# Patient Record
Sex: FEMALE | Race: BLACK OR AFRICAN AMERICAN | NOT HISPANIC OR LATINO | URBAN - METROPOLITAN AREA
[De-identification: names, ages, dates, MRNs, and addresses within clinical notes are randomized per-mention and may not be internally consistent; named-entity substitution may affect disease eponyms.]

---

## 2018-10-23 ENCOUNTER — EMERGENCY (EMERGENCY)
Facility: HOSPITAL | Age: 63
LOS: 1 days | Discharge: HOME | End: 2018-10-23
Attending: EMERGENCY MEDICINE | Admitting: EMERGENCY MEDICINE
Payer: SELF-PAY

## 2018-10-23 VITALS
SYSTOLIC BLOOD PRESSURE: 147 MMHG | DIASTOLIC BLOOD PRESSURE: 69 MMHG | OXYGEN SATURATION: 100 % | HEART RATE: 69 BPM | RESPIRATION RATE: 18 BRPM | TEMPERATURE: 98 F

## 2018-10-23 VITALS
TEMPERATURE: 97 F | DIASTOLIC BLOOD PRESSURE: 71 MMHG | HEART RATE: 72 BPM | RESPIRATION RATE: 18 BRPM | SYSTOLIC BLOOD PRESSURE: 128 MMHG | OXYGEN SATURATION: 100 %

## 2018-10-23 LAB
ALBUMIN SERPL ELPH-MCNC: 4.1 G/DL — SIGNIFICANT CHANGE UP (ref 3.3–5)
ALP SERPL-CCNC: 68 U/L — SIGNIFICANT CHANGE UP (ref 40–120)
ALT FLD-CCNC: 12 U/L — SIGNIFICANT CHANGE UP (ref 4–33)
APTT BLD: 37.1 SEC — SIGNIFICANT CHANGE UP (ref 27.5–37.4)
AST SERPL-CCNC: 20 U/L — SIGNIFICANT CHANGE UP (ref 4–32)
BASOPHILS # BLD AUTO: 0.02 K/UL — SIGNIFICANT CHANGE UP (ref 0–0.2)
BASOPHILS NFR BLD AUTO: 0.4 % — SIGNIFICANT CHANGE UP (ref 0–2)
BILIRUB SERPL-MCNC: 0.2 MG/DL — SIGNIFICANT CHANGE UP (ref 0.2–1.2)
BUN SERPL-MCNC: 15 MG/DL — SIGNIFICANT CHANGE UP (ref 7–23)
CALCIUM SERPL-MCNC: 9.8 MG/DL — SIGNIFICANT CHANGE UP (ref 8.4–10.5)
CHLORIDE SERPL-SCNC: 103 MMOL/L — SIGNIFICANT CHANGE UP (ref 98–107)
CO2 SERPL-SCNC: 25 MMOL/L — SIGNIFICANT CHANGE UP (ref 22–31)
CREAT SERPL-MCNC: 0.8 MG/DL — SIGNIFICANT CHANGE UP (ref 0.5–1.3)
EOSINOPHIL # BLD AUTO: 0.13 K/UL — SIGNIFICANT CHANGE UP (ref 0–0.5)
EOSINOPHIL NFR BLD AUTO: 2.7 % — SIGNIFICANT CHANGE UP (ref 0–6)
GLUCOSE SERPL-MCNC: 80 MG/DL — SIGNIFICANT CHANGE UP (ref 70–99)
HCT VFR BLD CALC: 40.2 % — SIGNIFICANT CHANGE UP (ref 34.5–45)
HGB BLD-MCNC: 13.7 G/DL — SIGNIFICANT CHANGE UP (ref 11.5–15.5)
IMM GRANULOCYTES # BLD AUTO: 0.01 # — SIGNIFICANT CHANGE UP
IMM GRANULOCYTES NFR BLD AUTO: 0.2 % — SIGNIFICANT CHANGE UP (ref 0–1.5)
INR BLD: 1.18 — HIGH (ref 0.88–1.17)
LYMPHOCYTES # BLD AUTO: 2.3 K/UL — SIGNIFICANT CHANGE UP (ref 1–3.3)
LYMPHOCYTES # BLD AUTO: 47.2 % — HIGH (ref 13–44)
MCHC RBC-ENTMCNC: 28.5 PG — SIGNIFICANT CHANGE UP (ref 27–34)
MCHC RBC-ENTMCNC: 34.1 % — SIGNIFICANT CHANGE UP (ref 32–36)
MCV RBC AUTO: 83.6 FL — SIGNIFICANT CHANGE UP (ref 80–100)
MONOCYTES # BLD AUTO: 0.61 K/UL — SIGNIFICANT CHANGE UP (ref 0–0.9)
MONOCYTES NFR BLD AUTO: 12.5 % — SIGNIFICANT CHANGE UP (ref 2–14)
NEUTROPHILS # BLD AUTO: 1.8 K/UL — SIGNIFICANT CHANGE UP (ref 1.8–7.4)
NEUTROPHILS NFR BLD AUTO: 37 % — LOW (ref 43–77)
NRBC # FLD: 0 — SIGNIFICANT CHANGE UP
PLATELET # BLD AUTO: 211 K/UL — SIGNIFICANT CHANGE UP (ref 150–400)
PMV BLD: 11.4 FL — SIGNIFICANT CHANGE UP (ref 7–13)
POTASSIUM SERPL-MCNC: 3.6 MMOL/L — SIGNIFICANT CHANGE UP (ref 3.5–5.3)
POTASSIUM SERPL-SCNC: 3.6 MMOL/L — SIGNIFICANT CHANGE UP (ref 3.5–5.3)
PROT SERPL-MCNC: 7 G/DL — SIGNIFICANT CHANGE UP (ref 6–8.3)
PROTHROM AB SERPL-ACNC: 13.6 SEC — HIGH (ref 9.8–13.1)
RBC # BLD: 4.81 M/UL — SIGNIFICANT CHANGE UP (ref 3.8–5.2)
RBC # FLD: 14.1 % — SIGNIFICANT CHANGE UP (ref 10.3–14.5)
SODIUM SERPL-SCNC: 141 MMOL/L — SIGNIFICANT CHANGE UP (ref 135–145)
TSH SERPL-MCNC: 3.09 UIU/ML — SIGNIFICANT CHANGE UP (ref 0.27–4.2)
WBC # BLD: 4.87 K/UL — SIGNIFICANT CHANGE UP (ref 3.8–10.5)
WBC # FLD AUTO: 4.87 K/UL — SIGNIFICANT CHANGE UP (ref 3.8–10.5)

## 2018-10-23 PROCEDURE — 99284 EMERGENCY DEPT VISIT MOD MDM: CPT

## 2018-10-23 PROCEDURE — 93971 EXTREMITY STUDY: CPT | Mod: 26,LT

## 2018-10-23 NOTE — ED ADULT NURSE NOTE - OBJECTIVE STATEMENT
Pt c/o LLE pain with mild swelling since last night.  Pt has  history of DVT x2 in left leg, on Pradaxa.  Pt AAOx3, respirations even and unlabored.  Pt ambulatory.  Labs drawn and sent; IV access obtained.  Pt pending ultrasound.

## 2018-10-23 NOTE — ED ADULT TRIAGE NOTE - CHIEF COMPLAINT QUOTE
pt presents c/o LLE pain with mild swelling since last night. pt reports history of dvt x2 in left leg- on pradaxa. pt also endorses [place] and recent travel to [place]. denies any additional symptoms.  PMHX: htn, dvt, arthritis

## 2018-10-23 NOTE — ED PROVIDER NOTE - MEDICAL DECISION MAKING DETAILS
(1) lle swelling and ttp patient with known h/o lle dvt compliant with pradaxa qd PLAN dvt lle, cbc, cmp, coags in case of hep gtt. (2) palpitations nos PLAN ekg, tsh, cbc r/o anemia

## 2018-10-23 NOTE — ED PROVIDER NOTE - PROGRESS NOTE DETAILS
Yo: duplex shows no DVT.  Plan as per sign-out to have follow-up with cardiology for eval of her palpitations

## 2018-10-23 NOTE — ED PROVIDER NOTE - PHYSICAL EXAMINATION
rHAMAN ATT: B/L LE SYMMETRICAL. MILD LT CALF TTP. BILATERAL FOOT STRONG PULSE SENS INTACT. B/L LE HIP KNEE ANKLE STRENGTH INTACT

## 2018-10-23 NOTE — ED ADULT NURSE NOTE - NSIMPLEMENTINTERV_GEN_ALL_ED
Implemented All Universal Safety Interventions:  [place] to call system. Call bell, personal items and telephone within reach. Instruct patient to call for assistance. Room bathroom lighting operational. Non-slip footwear when patient is off stretcher. Physically safe environment: no spills, clutter or unnecessary equipment. Stretcher in lowest position, wheels locked, appropriate side rails in place.

## 2018-10-23 NOTE — ED PROVIDER NOTE - OBJECTIVE STATEMENT
15:29 Saqib att: 63F h/o LLE DVT x 2 on pradaxa p/w LLE pain x 1 day. Since 2011 patient has had lle dvt x 2, takes pradaxa 110 mg qd everyday. 9/27 Patient had flight from [place]. Past 1 day patient notes LLE pain, points to medial calf. Denies cp or sob. Patient also notes past few days palpitations when she walks or goes up 2 flights of stairs. Denies melena or bleed. PMH htn, arthritis, dvt on pradaxa PSH appy, hysterectomy MED regabalin prn PCP in [place] NKDA

## 2021-08-17 ENCOUNTER — EMERGENCY (EMERGENCY)
Facility: HOSPITAL | Age: 66
LOS: 0 days | Discharge: HOME | End: 2021-08-17
Payer: SELF-PAY

## 2021-08-17 VITALS
HEIGHT: 66 IN | OXYGEN SATURATION: 98 % | SYSTOLIC BLOOD PRESSURE: 142 MMHG | DIASTOLIC BLOOD PRESSURE: 79 MMHG | TEMPERATURE: 98 F | RESPIRATION RATE: 18 BRPM | HEART RATE: 67 BPM | WEIGHT: 194.01 LBS

## 2021-08-17 DIAGNOSIS — M25.511 PAIN IN RIGHT SHOULDER: ICD-10-CM

## 2021-08-17 PROCEDURE — 99284 EMERGENCY DEPT VISIT MOD MDM: CPT

## 2021-08-17 PROCEDURE — 73030 X-RAY EXAM OF SHOULDER: CPT | Mod: 26,RT

## 2021-08-17 RX ORDER — OXYCODONE AND ACETAMINOPHEN 5; 325 MG/1; MG/1
1 TABLET ORAL ONCE
Refills: 0 | Status: DISCONTINUED | OUTPATIENT
Start: 2021-08-17 | End: 2021-08-17

## 2021-08-17 RX ADMIN — OXYCODONE AND ACETAMINOPHEN 1 TABLET(S): 5; 325 TABLET ORAL at 16:38

## 2021-08-17 RX ADMIN — OXYCODONE AND ACETAMINOPHEN 1 TABLET(S): 5; 325 TABLET ORAL at 17:08

## 2021-08-17 NOTE — ED PROVIDER NOTE - PHYSICAL EXAMINATION
Physical Exam    Vital Signs: I have reviewed the initial vital signs.  Constitutional: well-nourished, appears stated age, no acute distress  Eyes: PERRLA,  and symmetrical lids.  ENT: Neck supple with no adenopathy, moist MM.  Cardiovascular: regular rate, regular rhythm, well-perfused extremities, radial pulse +2 and equal b/l  Respiratory: unlabored respiratory effort, clear to auscultation bilaterally  Gastrointestinal: soft, non-tender abdomen, no pulsatile mass  Musculoskeletal: supple neck, +TTP over the R shoulder, full ROM in R shoulder  Integumentary: warm, dry, no rash  Neurologic: extremities’ motor and sensory functions grossly intact  Psychiatric: A&Ox3

## 2021-08-17 NOTE — ED PROVIDER NOTE - CARE PROVIDER_API CALL
Jakob Naik (DO)  Orthopaedic Surgery  [address] [place] [address]  Phone: [phone]  Fax: [phone]  Follow Up Time: 1-3 Days

## 2021-08-17 NOTE — ED PROVIDER NOTE - PATIENT PORTAL LINK FT
You can access the FollowMyHealth Patient Portal offered by [facility] by registering at the following website: [email or web address]. By joining [employer]’s FollowMyHealth portal, you will also be able to view your health information using other applications (apps) compatible with our system.

## 2021-08-17 NOTE — ED PROVIDER NOTE - CLINICAL SUMMARY MEDICAL DECISION MAKING FREE TEXT BOX
Pt 67 yo f pw ongoing R shoulder pain 1 mon out with ttp over the R shoulder pt is neurovascular intact. Plan follow up ortho.

## 2021-08-17 NOTE — ED PROVIDER NOTE - NSFOLLOWUPINSTRUCTIONS_ED_ALL_ED_FT
Shoulder Sprain    A shoulder sprain is a partial or complete tear in one of the tough, fiber-like tissues (ligaments) in the shoulder. The ligaments in the shoulder help to hold the shoulder in place.    What are the causes?  This condition may be caused by:    A fall.  A hit to the shoulder.  A twist of the arm.    What increases the risk?  This condition is more likely to develop in:    People who play sports.  People who have problems with balance or coordination.    What are the signs or symptoms?  Symptoms of this condition include:    Pain when moving the shoulder.  Limited ability to move the shoulder.  Swelling and tenderness on top of the shoulder.  Warmth in the shoulder.  A change in the shape of the shoulder.  Redness or bruising on the shoulder.    How is this diagnosed?  This condition is diagnosed with a physical exam. During the exam, you may be asked to do simple exercises with your shoulder. You may also have imaging tests, such as X-rays, MRI, or a CT scan. These tests can show how severe the sprain is.    How is this treated?  This condition may be treated with:    Rest.  Pain medicine.  Ice.  A sling or brace. This is used to keep the arm still while the shoulder is healing.  Physical therapy or rehabilitation exercises. These help to improve the range of motion and strength of the shoulder.  Surgery (rare). Surgery may be needed if the sprain caused a joint to become unstable. Surgery may also be needed to reduce pain.    Some people may develop ongoing shoulder pain or lose some range of motion in the shoulder. However, most people do not develop long-term problems.    Follow these instructions at home:  Ask your health care provider when it is safe for you to drive if you have a sling or brace on your shoulder.  Take over-the-counter and prescription medicines only as told by your health care provider.  If directed, apply ice to the area:    Put ice in a plastic bag.  Place a towel between your skin and the bag.  Leave the ice on for 20 minutes, 2–3 times per day.    If you were given a shoulder sling or brace:    Wear it as told.  Remove it to shower or bathe.  Move your arm only as much as told by your health care provider, but keep your hand moving to prevent swelling.    If you were shown how to do any exercises, do them as told by your health care provider.  Keep all follow-up visits as told by your health care provider. This is important.    Contact a health care provider if:  Your pain gets worse.  Your pain is not relieved with medicines.  You have increased redness or swelling.    Get help right away if:  You have a fever.  You cannot move your arm or shoulder.  You develop severe numbness or tingling in your arm, hand, or fingers.  Your arm, hand, or fingers turn blue, white, or gray and feel cold.

## 2021-08-17 NOTE — ED ADULT TRIAGE NOTE - CHIEF COMPLAINT QUOTE
Pt presents to ED with intermittent right shoulder/ elbow pain from mechanical fall that happen 7 month ago. Pt states " the pain wont go away." Pt had x-ray when fall occurred and was told it was muscle pain.

## 2021-08-17 NOTE — ED PROVIDER NOTE - OBJECTIVE STATEMENT
65 yo f pmhx sig for htn, hld pw ongoing R shoulder pain aching mod in severity non radiating with no weakness or numbness s/p foosh 1 month ago, Report the pain is worse with active shoulder movement with shoulder flexion.    I have reviewed available current nursing and previous documentation of past medical, surgical, family, and/or social history.

## 2021-08-17 NOTE — ED ADULT NURSE NOTE - NSIMPLEMENTINTERV_GEN_ALL_ED
Implemented All Universal Safety Interventions:  [place] to call system. Call bell, personal items and telephone within reach. Instruct patient to call for assistance. Room bathroom lighting operational. Non-slip footwear when patient is off stretcher. Physically safe environment: no spills, clutter or unnecessary equipment. Stretcher in lowest position, wheels locked, appropriate side rails in place.

## 2021-08-17 NOTE — ED ADULT NURSE NOTE - OBJECTIVE STATEMENT
Pt received with complaints of pain to R shoulder post fall 7 months ago, pt states she been treated for pain before but pain does not seem to go away.

## 2021-08-17 NOTE — ED ADULT NURSE NOTE - NSICDXPASTMEDICALHX_GEN_ALL_CORE_FT
PAST MEDICAL HISTORY:  DVT, lower extremity, recurrent     Mild HTN     No pertinent past medical history

## 2021-08-17 NOTE — ED ADULT NURSE NOTE - NS ED NURSE DISCH DISPOSITION
Patient was seen as a bedside consultation from Dr. Reggie Montes possible foreign body right foot. I did review radiographs and did discuss this with patient no foreign body noted. Any worsening symptoms or any increased drainage go directly to emergency room. I will follow-up in office on Tuesday. Discharged
